# Patient Record
Sex: MALE | Race: AMERICAN INDIAN OR ALASKA NATIVE
[De-identification: names, ages, dates, MRNs, and addresses within clinical notes are randomized per-mention and may not be internally consistent; named-entity substitution may affect disease eponyms.]

---

## 2019-12-20 ENCOUNTER — HOSPITAL ENCOUNTER (EMERGENCY)
Dept: HOSPITAL 5 - ED | Age: 44
Discharge: HOME | End: 2019-12-20
Payer: COMMERCIAL

## 2019-12-20 VITALS — SYSTOLIC BLOOD PRESSURE: 140 MMHG | DIASTOLIC BLOOD PRESSURE: 82 MMHG

## 2019-12-20 DIAGNOSIS — J11.1: Primary | ICD-10-CM

## 2019-12-20 PROCEDURE — 99283 EMERGENCY DEPT VISIT LOW MDM: CPT

## 2019-12-20 PROCEDURE — 71046 X-RAY EXAM CHEST 2 VIEWS: CPT

## 2019-12-20 NOTE — EMERGENCY DEPARTMENT REPORT
ED General Adult HPI





- General


Chief complaint: Upper Respiratory Infection


Stated complaint: FLU SYM


Time Seen by Provider: 12/20/19 10:25


Source: patient


Mode of arrival: Ambulatory


Limitations: No Limitations





- History of Present Illness


Initial comments: 





44-year-old -American male patient complains of cough  

fever/chills/bodyaches and 2 days.  He states his cough is productive of yellow 

mucus.  He denies any shortness of breath or chest pain.  He states 

over-the-counter cold medications are not helping with his symptoms.  Patient is

fever is tactile.  He admits to loss of appetite, but denies any 

nausea/vomiting/diarrhea or abdominal pain.


-: Sudden


Severity scale (0 -10): 0





- Related Data


                                  Previous Rx's











 Medication  Instructions  Recorded  Last Taken  Type


 


Benzonatate 200 mg PO TID PRN #30 capsule 12/20/19 Unknown Rx


 


Ibuprofen [Motrin 800 MG tab] 800 mg PO Q8HR PRN #21 tablet 12/20/19 Unknown Rx


 


Ondansetron [Zofran Odt] 4 mg PO Q8HR PRN #15 tab.rapdis 12/20/19 Unknown Rx


 


Oseltamivir [Tamiflu] 75 mg PO BID 5 Days #10 cap 12/20/19 Unknown Rx











                                    Allergies











Allergy/AdvReac Type Severity Reaction Status Date / Time


 


No Known Allergies Allergy   Unverified 12/20/19 09:01














ED Review of Systems


ROS: 


Stated complaint: FLU SYM


Other details as noted in HPI





Constitutional: see HPI


ENT: denies: throat pain


Respiratory: cough.  denies: shortness of breath


Musculoskeletal: myalgia





ED Past Medical Hx





- Past Medical History


Previous Medical History?: No





- Surgical History


Past Surgical History?: No





- Social History


Smoking Status: Unknown if ever smoked


Substance Use Type: None





- Medications


Home Medications: 


                                Home Medications











 Medication  Instructions  Recorded  Confirmed  Last Taken  Type


 


Benzonatate 200 mg PO TID PRN #30 capsule 12/20/19  Unknown Rx


 


Ibuprofen [Motrin 800 MG tab] 800 mg PO Q8HR PRN #21 tablet 12/20/19  Unknown Rx


 


Ondansetron [Zofran Odt] 4 mg PO Q8HR PRN #15 tab.rapdis 12/20/19  Unknown Rx


 


Oseltamivir [Tamiflu] 75 mg PO BID 5 Days #10 cap 12/20/19  Unknown Rx














ED Physical Exam





- General


Limitations: No Limitations


General appearance: alert, in no apparent distress, other (patient appears tired

 and fatigued)





- Head


Head exam: Present: atraumatic, normocephalic





- Eye


Eye exam: Present: normal appearance





- ENT


ENT exam: Present: mucous membranes moist





- Neck


Neck exam: Present: normal inspection





- Respiratory


Respiratory exam: Present: normal lung sounds bilaterally.  Absent: respiratory 

distress, wheezes, rales, rhonchi, stridor





- Cardiovascular


Cardiovascular Exam: Present: regular rate, normal rhythm.  Absent: systolic 

murmur, diastolic murmur, rubs, gallop





- GI/Abdominal


GI/Abdominal exam: Present: soft.  Absent: tenderness





- Extremities Exam


Extremities exam: Present: normal inspection





- Back Exam


Back exam: Present: normal inspection





- Neurological Exam


Neurological exam: Present: alert, oriented X3





- Psychiatric


Psychiatric exam: Present: normal affect, normal mood





ED Course


                                   Vital Signs











  12/20/19 12/20/19





  09:02 10:00


 


Temperature 100.1 F H 100.1 F H


 


Pulse Rate 90 95 H


 


Respiratory 22 18





Rate  


 


Blood Pressure  141/86


 


Blood Pressure 144/86 





[Left]  


 


O2 Sat by Pulse 99 





Oximetry  














ED Medical Decision Making





- Medical Decision Making





44-year-old -American male patient complains of cough  

fever/chills/bodyaches and 2 days.  Chest x-ray is negative for pneumonia.  She 

noted to have a low-grade fever of 100.1 upon arrival.  Fever has not resolved 

with Tylenol.  Patient's symptoms and exam are consistent with influenza.  

Patient is stable for discharge home. Will prescribe Tamiflu and symptomatic 

medications.  Recommend follow with PCP in 5-7 days.  Discussed strict return 

precautions in detail with patient who states understanding.


Critical care attestation.: 


If time is entered above; I have spent that time in minutes in the direct care 

of this critically ill patient, excluding procedure time.








ED Disposition


Clinical Impression: 


 Influenza





Disposition: DC-01 TO HOME OR SELFCARE


Is pt being admited?: No


Condition: Stable


Instructions:  Influenza (ED)


Prescriptions: 


Benzonatate 200 mg PO TID PRN #30 capsule


 PRN Reason: Cough


Ibuprofen [Motrin 800 MG tab] 800 mg PO Q8HR PRN #21 tablet


 PRN Reason: fever/pain


Oseltamivir [Tamiflu] 75 mg PO BID 5 Days #10 cap


Ondansetron [Zofran Odt] 4 mg PO Q8HR PRN #15 tab.rapdis


 PRN Reason: Nausea


Referrals: 


PRIMARY CARE,MD [Primary Care Provider] - 7-10 days

## 2019-12-20 NOTE — XRAY REPORT
PA AND LATERAL CXR



HISTORY: Cough, fever



COMPARISON: None



FINDINGS:

Cardiomediastinal silhouette: Normal cardiac size.  Normal mediastinal contours.

Lungs: Normal expansion.  Normal lung aeration.  No pleural effusions.  No pneumothorax.

Pulmonary vascularity: Normal.



Support hardware: None.



Additional findings: None.



IMPRESSION:



No evidence of acute cardiopulmonary disease.



Signer Name: Robert Gaitan MD 

Signed: 12/20/2019 10:49 AM

 Workstation Name: [id number]